# Patient Record
Sex: FEMALE | Race: OTHER | NOT HISPANIC OR LATINO | URBAN - METROPOLITAN AREA
[De-identification: names, ages, dates, MRNs, and addresses within clinical notes are randomized per-mention and may not be internally consistent; named-entity substitution may affect disease eponyms.]

---

## 2019-03-01 ENCOUNTER — OUTPATIENT (OUTPATIENT)
Dept: OUTPATIENT SERVICES | Facility: HOSPITAL | Age: 79
LOS: 1 days | End: 2019-03-01
Payer: MEDICAID

## 2019-03-04 ENCOUNTER — INPATIENT (INPATIENT)
Facility: HOSPITAL | Age: 79
LOS: 1 days | Discharge: HOME | DRG: 303 | End: 2019-03-06
Attending: THORACIC SURGERY (CARDIOTHORACIC VASCULAR SURGERY) | Admitting: THORACIC SURGERY (CARDIOTHORACIC VASCULAR SURGERY)
Payer: MEDICAID

## 2019-03-04 VITALS
SYSTOLIC BLOOD PRESSURE: 111 MMHG | WEIGHT: 142.2 LBS | RESPIRATION RATE: 18 BRPM | OXYGEN SATURATION: 97 % | HEART RATE: 78 BPM | HEIGHT: 60 IN | DIASTOLIC BLOOD PRESSURE: 74 MMHG | TEMPERATURE: 99 F

## 2019-03-04 DIAGNOSIS — I25.10 ATHEROSCLEROTIC HEART DISEASE OF NATIVE CORONARY ARTERY WITHOUT ANGINA PECTORIS: ICD-10-CM

## 2019-03-04 PROCEDURE — 99223 1ST HOSP IP/OBS HIGH 75: CPT

## 2019-03-04 NOTE — H&P ADULT - ASSESSMENT
77 y.o. Iraqi speaking female who presented for elective cardiac catheterization s/p MI and CVA in 5/2018 with residual left arm weakness and dysarthria. Patient was found to have abnormal stress test -EF 45%, anterior wall ischemia. Echo EF 55%, mild to moderate MR, Mild LA enlargement and was transferred for assessment of candidacy for surgical management of left main/multivessel CAD.   Daughters have verbalized that they want what is best for their mother, agreeable to surgery, but would like to discuss all options with the surgeon.

## 2019-03-04 NOTE — H&P ADULT - NSHPREVIEWOFSYSTEMS_GEN_ALL_CORE
Review of Systems  GENERAL:  Fevers[] chills[] sweats[] fatigue[] unintentional weight loss[] weight gain []                                        NEURO:  paresthesias[] seizures []  syncope []  confusion []                                                                                  EYES: glasses[]  blurry vision[]  discharge[] pain[] glaucoma []                                                                            ENMT:  difficulty hearing []  vertigo[]  dysphagia[] epistaxis[] recent dental work []                                      CV:  chest pain[] palpitations[] PERALES [] diaphoresis [] edema[]                                                                                             RESPIRATORY:  wheezing[] SOB[] cough [] sputum[] hemoptysis[]                                                                    GI:  nausea[]  vomiting []  diarrhea[] constipation [] melena []   abdominal pain [  ]                                                                     : hematuria[ ]  dysuria[ ] urgency[] incontinence[]                                                                                              MUSCULOSKELETAL:  arthritis[ ]  joint swelling [ ] muscle weakness [ ]                                                                  SKIN/BREAST:  rash[ ] itching [ ]  hair loss[ ] masses[ ]   incisional drainage [  ]                                                                                             PSYCH:  dementia [ ] depression [ ] anxiety[ ]                                                                                                                  HEME/LYMPH:  bruises easily[ ] enlarged lymph nodes[ ] tender lymph nodes[ ]                                                 ENDOCRINE:  cold intolerance[ ] heat intolerance[ ] polydipsia[ ]

## 2019-03-04 NOTE — H&P ADULT - NSHPLABSRESULTS_GEN_ALL_CORE
Cath:  No ventriculogram recent echocardiogram  demonstrated an EF of 40 %.  CORONARY VESSELS: The coronary circulation is right dominant.  LM:   --  Distal left main: There was a 40 % stenosis.  LAD:   --  Proximal LAD: There was a tubular 70 % stenosis.  --  Mid LAD: There was a diffuse 90 % stenosis.  --  D2: There was a tubular 60 % stenosis.  CX:   --  Proximal circumflex: There was a tubular 99 % stenosis.  --  Mid circumflex: There was a tubular 80 % stenosis.  RCA:   --  RCA: Normal.  COMPLICATIONS: There were no complications.  INTERVENTIONAL RECOMMENDATIONS: Consultation with a cardiac surgeon be  obtained for surgical opinion and coronary artery bypass grafting.  Prepared and signed by  Himanshu Kiran M.D.  Signed 03/04/2019 17:31:58

## 2019-03-04 NOTE — H&P ADULT - PMH
Coronary artery disease involving native coronary artery of native heart without angina pectoris    CVA, old, dysarthria    Dentures complicating chewing    Dysarthria    Left arm weakness    Left main coronary artery disease    Vascular dementia without behavioral disturbance

## 2019-03-04 NOTE — H&P ADULT - NSHPSOCIALHISTORY_GEN_ALL_CORE
SOCIAL HISTORY:  Smoker: [x ] Yes  [ ] No        PACK YEARS:          Quit date:  Unknown but per daughter via translation patient smoked for many years  ETOH use: [ ] Yes  [x ] No              FREQUENCY / QUANTITY:  Ilicit Drug use:  [ ] Yes  [x ] No  Occupation: homemaker   Living arrangements: with daughters.  Patient is able to provide simple self care with family supervision  Assist device use: ambulates independently

## 2019-03-04 NOTE — H&P ADULT - PROBLEM SELECTOR PLAN 1
admit to 2[facility]: Dr. Yo  telemetry monitoring  Increase beta blocker to BID  Hold ARB in anticipation of OR  continue aspirin

## 2019-03-04 NOTE — H&P ADULT - HISTORY OF PRESENT ILLNESS
Patient has two medical record numbers [id number] 77 y.o. Frisian speaking female who presented for elective cardiac catheterization s/p MI and CVA in 5/2018 with residual left arm weakness and dysarthria.  While in [facility] [place], was evaluated by her cardiologist, recommended to have cardiac cath. Patient's daughter sought out second opinion where the patient was found to have abnormal stress test -EF 45%, anterior wall ischemia. Echo EF 55%, mild to moderate MR, Mild LA enlargement.     As  per patient's daughter, the patient doesn't have chest pain, SOB, palpitations, dizziness, presyncope, syncope, b/l lower extremities edema, abdominal pain, N/V/D/C, melena, hematochezia, h/o DVT, PE, OFELIA, fever, chills, urinary symptoms, hematuria, recent travel, sick contacts. As per patient's daughter, the patient does suffer from memory impairment post CVA and frequently has "bluish decoloration of her fingers. Due to patient's PMH and abnormal non invasive tests, the patient was recommended to have cardiac cath.   The patient was on Simvastatin 20 mg po daily. As per patient's daughter, Simvastatin stopped by patient's cardiologist, unknown reason.    Cardiac cath revealed left main and multivessel CAD.  She was transferred from [facility] after procedure for further management.    Currently, the patient is A+Ox1, without pain, and can follow simple commands with prompting when spoken to in Frisian,  and daughters at bedside.  The patient and family are exclusively Frisian speaking and require translation via [employer].    Per , patient denies chest pain, shortness of breath, fever, chills, abdominal pain, nausea, vomiting, nor dysuria.   +left shoulder pain which is relieved by warm pack and massage.       Patient has two medical record numbers [id number] 77 y.o. Arabic speaking female who presented for elective cardiac catheterization s/p MI and CVA in 5/2018 with residual left arm weakness and dysarthria.  While in [facility] [place], was evaluated by her cardiologist, recommended to have cardiac cath. Patient's daughter sought out second opinion where the patient was found to have abnormal stress test -EF 45%, anterior wall ischemia. Echo EF 55%, mild to moderate MR, Mild LA enlargement.     As  per patient's daughter, the patient doesn't have chest pain, SOB, palpitations, dizziness, presyncope, syncope, b/l lower extremities edema, abdominal pain, N/V/D/C, melena, hematochezia, h/o DVT, PE, OFELIA, fever, chills, urinary symptoms, hematuria, recent travel, sick contacts. As per patient's daughter, the patient does suffer from memory impairment post CVA and frequently has "bluish decoloration of her fingers. Due to patient's PMH and abnormal non invasive tests, the patient was recommended to have cardiac cath.   The patient was on Simvastatin 20 mg po daily. As per patient's daughter, Simvastatin stopped by patient's cardiologist, unknown reason.    Cardiac cath revealed left main and multivessel CAD.  She was transferred from [facility] after procedure for further management.    Currently, the patient is A+Ox1, without pain, and can follow simple commands with prompting when spoken to in Arabic,  and daughters at bedside.  The patient and family are exclusively Arabic speaking and require translation via [employer].    Per , patient denies chest pain, shortness of breath, fever, chills, abdominal pain, nausea, vomiting, nor dysuria.   +left shoulder pain which is relieved by warm pack and massage.     Daughters: Eunice Gill and Vanessa make medical decisions for the patient    Patient has two medical record numbers [id number]

## 2019-03-05 DIAGNOSIS — Z01.818 ENCOUNTER FOR OTHER PREPROCEDURAL EXAMINATION: ICD-10-CM

## 2019-03-05 DIAGNOSIS — I25.10 ATHEROSCLEROTIC HEART DISEASE OF NATIVE CORONARY ARTERY WITHOUT ANGINA PECTORIS: ICD-10-CM

## 2019-03-05 DIAGNOSIS — Z29.9 ENCOUNTER FOR PROPHYLACTIC MEASURES, UNSPECIFIED: ICD-10-CM

## 2019-03-05 DIAGNOSIS — Z79.01 LONG TERM (CURRENT) USE OF ANTICOAGULANTS: ICD-10-CM

## 2019-03-05 DIAGNOSIS — Z98.890 OTHER SPECIFIED POSTPROCEDURAL STATES: Chronic | ICD-10-CM

## 2019-03-05 DIAGNOSIS — I69.322 DYSARTHRIA FOLLOWING CEREBRAL INFARCTION: ICD-10-CM

## 2019-03-05 LAB
ALBUMIN SERPL ELPH-MCNC: 4.2 G/DL — SIGNIFICANT CHANGE UP (ref 3.3–5)
ALP SERPL-CCNC: 68 U/L — SIGNIFICANT CHANGE UP (ref 40–120)
ALT FLD-CCNC: 12 U/L — SIGNIFICANT CHANGE UP (ref 10–45)
ANION GAP SERPL CALC-SCNC: 15 MMOL/L — SIGNIFICANT CHANGE UP (ref 5–17)
APPEARANCE UR: CLEAR — SIGNIFICANT CHANGE UP
APTT BLD: 41.4 SEC — HIGH (ref 27.5–36.3)
APTT BLD: 54.4 SEC — HIGH (ref 27.5–36.3)
AST SERPL-CCNC: 17 U/L — SIGNIFICANT CHANGE UP (ref 10–40)
BACTERIA # UR AUTO: NEGATIVE — SIGNIFICANT CHANGE UP
BASOPHILS # BLD AUTO: 0 K/UL — SIGNIFICANT CHANGE UP (ref 0–0.2)
BASOPHILS NFR BLD AUTO: 0.6 % — SIGNIFICANT CHANGE UP (ref 0–2)
BILIRUB SERPL-MCNC: 0.4 MG/DL — SIGNIFICANT CHANGE UP (ref 0.2–1.2)
BILIRUB UR-MCNC: NEGATIVE — SIGNIFICANT CHANGE UP
BLD GP AB SCN SERPL QL: NEGATIVE — SIGNIFICANT CHANGE UP
BUN SERPL-MCNC: 20 MG/DL — SIGNIFICANT CHANGE UP (ref 7–23)
CALCIUM SERPL-MCNC: 10.2 MG/DL — SIGNIFICANT CHANGE UP (ref 8.4–10.5)
CHLORIDE SERPL-SCNC: 100 MMOL/L — SIGNIFICANT CHANGE UP (ref 96–108)
CO2 SERPL-SCNC: 22 MMOL/L — SIGNIFICANT CHANGE UP (ref 22–31)
COLOR SPEC: SIGNIFICANT CHANGE UP
CREAT SERPL-MCNC: 1 MG/DL — SIGNIFICANT CHANGE UP (ref 0.5–1.3)
DIFF PNL FLD: NEGATIVE — SIGNIFICANT CHANGE UP
EOSINOPHIL # BLD AUTO: 0.2 K/UL — SIGNIFICANT CHANGE UP (ref 0–0.5)
EOSINOPHIL NFR BLD AUTO: 2.9 % — SIGNIFICANT CHANGE UP (ref 0–6)
EPI CELLS # UR: 3 /HPF — SIGNIFICANT CHANGE UP
FIBRINOGEN PPP-MCNC: 602 MG/DL — HIGH (ref 350–510)
GLUCOSE BLDC GLUCOMTR-MCNC: 116 MG/DL — HIGH (ref 70–99)
GLUCOSE BLDC GLUCOMTR-MCNC: 122 MG/DL — HIGH (ref 70–99)
GLUCOSE BLDC GLUCOMTR-MCNC: 125 MG/DL — HIGH (ref 70–99)
GLUCOSE BLDC GLUCOMTR-MCNC: 141 MG/DL — HIGH (ref 70–99)
GLUCOSE SERPL-MCNC: 103 MG/DL — HIGH (ref 70–99)
GLUCOSE UR QL: NEGATIVE — SIGNIFICANT CHANGE UP
HCT VFR BLD CALC: 39.1 % — SIGNIFICANT CHANGE UP (ref 34.5–45)
HCT VFR BLD CALC: 39.5 % — SIGNIFICANT CHANGE UP (ref 34.5–45)
HGB BLD-MCNC: 13.7 G/DL — SIGNIFICANT CHANGE UP (ref 11.5–15.5)
HGB BLD-MCNC: 13.9 G/DL — SIGNIFICANT CHANGE UP (ref 11.5–15.5)
HYALINE CASTS # UR AUTO: 1 /LPF — SIGNIFICANT CHANGE UP (ref 0–2)
INR BLD: 1.02 RATIO — SIGNIFICANT CHANGE UP (ref 0.88–1.16)
KETONES UR-MCNC: NEGATIVE — SIGNIFICANT CHANGE UP
LEUKOCYTE ESTERASE UR-ACNC: ABNORMAL
LYMPHOCYTES # BLD AUTO: 2.5 K/UL — SIGNIFICANT CHANGE UP (ref 1–3.3)
LYMPHOCYTES # BLD AUTO: 39.9 % — SIGNIFICANT CHANGE UP (ref 13–44)
MCHC RBC-ENTMCNC: 29.4 PG — SIGNIFICANT CHANGE UP (ref 27–34)
MCHC RBC-ENTMCNC: 29.5 PG — SIGNIFICANT CHANGE UP (ref 27–34)
MCHC RBC-ENTMCNC: 35 GM/DL — SIGNIFICANT CHANGE UP (ref 32–36)
MCHC RBC-ENTMCNC: 35.1 GM/DL — SIGNIFICANT CHANGE UP (ref 32–36)
MCV RBC AUTO: 83.6 FL — SIGNIFICANT CHANGE UP (ref 80–100)
MCV RBC AUTO: 84.4 FL — SIGNIFICANT CHANGE UP (ref 80–100)
MONOCYTES # BLD AUTO: 0.7 K/UL — SIGNIFICANT CHANGE UP (ref 0–0.9)
MONOCYTES NFR BLD AUTO: 11.5 % — SIGNIFICANT CHANGE UP (ref 2–14)
MRSA PCR RESULT.: SIGNIFICANT CHANGE UP
NEUTROPHILS # BLD AUTO: 2.8 K/UL — SIGNIFICANT CHANGE UP (ref 1.8–7.4)
NEUTROPHILS NFR BLD AUTO: 45.2 % — SIGNIFICANT CHANGE UP (ref 43–77)
NITRITE UR-MCNC: NEGATIVE — SIGNIFICANT CHANGE UP
PH UR: 7 — SIGNIFICANT CHANGE UP (ref 5–8)
PLATELET # BLD AUTO: 258 K/UL — SIGNIFICANT CHANGE UP (ref 150–400)
PLATELET # BLD AUTO: 259 K/UL — SIGNIFICANT CHANGE UP (ref 150–400)
POTASSIUM SERPL-MCNC: 4.5 MMOL/L — SIGNIFICANT CHANGE UP (ref 3.5–5.3)
POTASSIUM SERPL-SCNC: 4.5 MMOL/L — SIGNIFICANT CHANGE UP (ref 3.5–5.3)
PROT SERPL-MCNC: 7.3 G/DL — SIGNIFICANT CHANGE UP (ref 6–8.3)
PROT UR-MCNC: ABNORMAL
PROTHROM AB SERPL-ACNC: 11.7 SEC — SIGNIFICANT CHANGE UP (ref 10–12.9)
RBC # BLD: 4.63 M/UL — SIGNIFICANT CHANGE UP (ref 3.8–5.2)
RBC # BLD: 4.73 M/UL — SIGNIFICANT CHANGE UP (ref 3.8–5.2)
RBC # FLD: 12.1 % — SIGNIFICANT CHANGE UP (ref 10.3–14.5)
RBC # FLD: 12.2 % — SIGNIFICANT CHANGE UP (ref 10.3–14.5)
RBC CASTS # UR COMP ASSIST: 3 /HPF — SIGNIFICANT CHANGE UP (ref 0–4)
RH IG SCN BLD-IMP: POSITIVE — SIGNIFICANT CHANGE UP
RH IG SCN BLD-IMP: POSITIVE — SIGNIFICANT CHANGE UP
S AUREUS DNA NOSE QL NAA+PROBE: SIGNIFICANT CHANGE UP
SODIUM SERPL-SCNC: 137 MMOL/L — SIGNIFICANT CHANGE UP (ref 135–145)
SP GR SPEC: 1.03 — HIGH (ref 1.01–1.02)
UROBILINOGEN FLD QL: NEGATIVE — SIGNIFICANT CHANGE UP
WBC # BLD: 5.2 K/UL — SIGNIFICANT CHANGE UP (ref 3.8–10.5)
WBC # BLD: 6.3 K/UL — SIGNIFICANT CHANGE UP (ref 3.8–10.5)
WBC # FLD AUTO: 5.2 K/UL — SIGNIFICANT CHANGE UP (ref 3.8–10.5)
WBC # FLD AUTO: 6.3 K/UL — SIGNIFICANT CHANGE UP (ref 3.8–10.5)
WBC UR QL: 93 /HPF — HIGH (ref 0–5)

## 2019-03-05 PROCEDURE — 93306 TTE W/DOPPLER COMPLETE: CPT | Mod: 26

## 2019-03-05 PROCEDURE — 99232 SBSQ HOSP IP/OBS MODERATE 35: CPT

## 2019-03-05 PROCEDURE — 93010 ELECTROCARDIOGRAM REPORT: CPT

## 2019-03-05 PROCEDURE — 71045 X-RAY EXAM CHEST 1 VIEW: CPT | Mod: 26

## 2019-03-05 RX ORDER — HEPARIN SODIUM 5000 [USP'U]/ML
3800 INJECTION INTRAVENOUS; SUBCUTANEOUS EVERY 6 HOURS
Qty: 0 | Refills: 0 | Status: DISCONTINUED | OUTPATIENT
Start: 2019-03-05 | End: 2019-03-06

## 2019-03-05 RX ORDER — INSULIN LISPRO 100/ML
VIAL (ML) SUBCUTANEOUS
Qty: 0 | Refills: 0 | Status: DISCONTINUED | OUTPATIENT
Start: 2019-03-05 | End: 2019-03-05

## 2019-03-05 RX ORDER — INSULIN LISPRO 100/ML
VIAL (ML) SUBCUTANEOUS
Qty: 0 | Refills: 0 | Status: DISCONTINUED | OUTPATIENT
Start: 2019-03-05 | End: 2019-03-06

## 2019-03-05 RX ORDER — DEXTROSE 50 % IN WATER 50 %
15 SYRINGE (ML) INTRAVENOUS ONCE
Qty: 0 | Refills: 0 | Status: DISCONTINUED | OUTPATIENT
Start: 2019-03-05 | End: 2019-03-06

## 2019-03-05 RX ORDER — CHLORHEXIDINE GLUCONATE 213 G/1000ML
5 SOLUTION TOPICAL ONCE
Qty: 0 | Refills: 0 | Status: DISCONTINUED | OUTPATIENT
Start: 2019-03-05 | End: 2019-03-05

## 2019-03-05 RX ORDER — DEXTROSE 50 % IN WATER 50 %
12.5 SYRINGE (ML) INTRAVENOUS ONCE
Qty: 0 | Refills: 0 | Status: DISCONTINUED | OUTPATIENT
Start: 2019-03-05 | End: 2019-03-06

## 2019-03-05 RX ORDER — HEPARIN SODIUM 5000 [USP'U]/ML
INJECTION INTRAVENOUS; SUBCUTANEOUS
Qty: 25000 | Refills: 0 | Status: DISCONTINUED | OUTPATIENT
Start: 2019-03-05 | End: 2019-03-06

## 2019-03-05 RX ORDER — CHLORHEXIDINE GLUCONATE 213 G/1000ML
1 SOLUTION TOPICAL ONCE
Qty: 0 | Refills: 0 | Status: DISCONTINUED | OUTPATIENT
Start: 2019-03-05 | End: 2019-03-05

## 2019-03-05 RX ORDER — GLUCAGON INJECTION, SOLUTION 0.5 MG/.1ML
1 INJECTION, SOLUTION SUBCUTANEOUS ONCE
Qty: 0 | Refills: 0 | Status: DISCONTINUED | OUTPATIENT
Start: 2019-03-05 | End: 2019-03-06

## 2019-03-05 RX ORDER — ASPIRIN/CALCIUM CARB/MAGNESIUM 324 MG
81 TABLET ORAL DAILY
Qty: 0 | Refills: 0 | Status: DISCONTINUED | OUTPATIENT
Start: 2019-03-05 | End: 2019-03-06

## 2019-03-05 RX ORDER — METOPROLOL TARTRATE 50 MG
25 TABLET ORAL
Qty: 0 | Refills: 0 | Status: DISCONTINUED | OUTPATIENT
Start: 2019-03-05 | End: 2019-03-06

## 2019-03-05 RX ORDER — ASPIRIN/CALCIUM CARB/MAGNESIUM 324 MG
81 TABLET ORAL DAILY
Qty: 0 | Refills: 0 | Status: DISCONTINUED | OUTPATIENT
Start: 2019-03-05 | End: 2019-03-05

## 2019-03-05 RX ORDER — METOPROLOL TARTRATE 50 MG
25 TABLET ORAL
Qty: 0 | Refills: 0 | COMMUNITY

## 2019-03-05 RX ORDER — DEXTROSE 50 % IN WATER 50 %
25 SYRINGE (ML) INTRAVENOUS ONCE
Qty: 0 | Refills: 0 | Status: DISCONTINUED | OUTPATIENT
Start: 2019-03-05 | End: 2019-03-06

## 2019-03-05 RX ORDER — METOPROLOL TARTRATE 50 MG
25 TABLET ORAL DAILY
Qty: 0 | Refills: 0 | Status: DISCONTINUED | OUTPATIENT
Start: 2019-03-05 | End: 2019-03-05

## 2019-03-05 RX ORDER — SODIUM CHLORIDE 9 MG/ML
1000 INJECTION, SOLUTION INTRAVENOUS
Qty: 0 | Refills: 0 | Status: DISCONTINUED | OUTPATIENT
Start: 2019-03-05 | End: 2019-03-05

## 2019-03-05 RX ORDER — CEFUROXIME AXETIL 250 MG
1500 TABLET ORAL ONCE
Qty: 0 | Refills: 0 | Status: DISCONTINUED | OUTPATIENT
Start: 2019-03-05 | End: 2019-03-05

## 2019-03-05 RX ORDER — ATORVASTATIN CALCIUM 80 MG/1
80 TABLET, FILM COATED ORAL AT BEDTIME
Qty: 0 | Refills: 0 | Status: DISCONTINUED | OUTPATIENT
Start: 2019-03-05 | End: 2019-03-06

## 2019-03-05 RX ORDER — PANTOPRAZOLE SODIUM 20 MG/1
40 TABLET, DELAYED RELEASE ORAL
Qty: 0 | Refills: 0 | Status: DISCONTINUED | OUTPATIENT
Start: 2019-03-05 | End: 2019-03-06

## 2019-03-05 RX ADMIN — Medication 25 MILLIGRAM(S): at 05:01

## 2019-03-05 RX ADMIN — ATORVASTATIN CALCIUM 80 MILLIGRAM(S): 80 TABLET, FILM COATED ORAL at 22:42

## 2019-03-05 RX ADMIN — Medication 81 MILLIGRAM(S): at 12:27

## 2019-03-05 RX ADMIN — HEPARIN SODIUM 850 UNIT(S)/HR: 5000 INJECTION INTRAVENOUS; SUBCUTANEOUS at 12:28

## 2019-03-05 RX ADMIN — HEPARIN SODIUM 850 UNIT(S)/HR: 5000 INJECTION INTRAVENOUS; SUBCUTANEOUS at 19:31

## 2019-03-05 RX ADMIN — PANTOPRAZOLE SODIUM 40 MILLIGRAM(S): 20 TABLET, DELAYED RELEASE ORAL at 05:01

## 2019-03-05 RX ADMIN — HEPARIN SODIUM 750 UNIT(S)/HR: 5000 INJECTION INTRAVENOUS; SUBCUTANEOUS at 05:01

## 2019-03-05 RX ADMIN — Medication 25 MILLIGRAM(S): at 18:59

## 2019-03-05 NOTE — PROGRESS NOTE ADULT - ASSESSMENT
History of Present Illness: 	  77 y.o. Icelandic speaking female who presented for elective cardiac catheterization s/p MI and CVA in 5/2018 with residual left arm weakness and dysarthria.  While in [facility] [place], was evaluated by her cardiologist, recommended to have cardiac cath. Patient's daughter sought out second opinion where the patient was found to have abnormal stress test -EF 45%, anterior wall ischemia. Echo EF 55%, mild to moderate MR, Mild LA enlargement.     As  per patient's daughter, the patient doesn't have chest pain, SOB, palpitations, dizziness, presyncope, syncope, b/l lower extremities edema, abdominal pain, N/V/D/C, melena, hematochezia, h/o DVT, PE, OFELIA, fever, chills, urinary symptoms, hematuria, recent travel, sick contacts. As per patient's daughter, the patient does suffer from memory impairment post CVA and frequently has "bluish decoloration of her fingers. Due to patient's PMH and abnormal non invasive tests, the patient was recommended to have cardiac cath.   The patient was on Simvastatin 20 mg po daily. As per patient's daughter, Simvastatin stopped by patient's cardiologist, unknown reason.    Cardiac cath revealed left main and multivessel

## 2019-03-05 NOTE — PROGRESS NOTE ADULT - SUBJECTIVE AND OBJECTIVE BOX
VITAL SIGNS    Telemetry:      Vital Signs Last 24 Hrs  T(C): 36.7 (03-05-19 @ 05:06), Max: 37 (03-04-19 @ 23:23)  T(F): 98.1 (03-05-19 @ 05:06), Max: 98.6 (03-04-19 @ 23:23)  HR: 70 (03-05-19 @ 05:06) (70 - 78)  BP: 130/66 (03-05-19 @ 05:06) (111/74 - 130/66)  RR: 18 (03-05-19 @ 05:06) (18 - 18)  SpO2: 98% (03-05-19 @ 05:06) (97% - 98%)                   Daily Height in cm: 152.4 (04 Mar 2019 23:23)    Daily       Bilirubin Total, Serum: 0.4 mg/dL (03-05 @ 01:39)    CAPILLARY BLOOD GLUCOSE      POCT Blood Glucose.: 122 mg/dL (05 Mar 2019 07:16)          Drains:     MS         [  ] Drainage:                 L Pleural  [  ]  Drainage:                R Pleural  [  ]  Drainage:    Pacing Wires        [  ]   Settings:                                  Isolated  [  ]    Coumadin    [ ] YES          [  ]      NO         Reason:                         PHYSICAL EXAM    Neurology: alert and oriented x 3, moves all extremities with no defecits  CV :  RRR  Sternal Wound :  CDI , Stable  Lungs:   CTA B/L  Abdomen: soft, nontender, nondistended, positive bowel sounds, last bowel movement   Extremities: VITAL SIGNS    Telemetry:    afib  60-80    Vital Signs Last 24 Hrs  T(C): 36.7 (03-05-19 @ 05:06), Max: 37 (03-04-19 @ 23:23)  T(F): 98.1 (03-05-19 @ 05:06), Max: 98.6 (03-04-19 @ 23:23)  HR: 70 (03-05-19 @ 05:06) (70 - 78)  BP: 130/66 (03-05-19 @ 05:06) (111/74 - 130/66)  RR: 18 (03-05-19 @ 05:06) (18 - 18)  SpO2: 98% (03-05-19 @ 05:06) (97% - 98%)                   Daily Height in cm: 152.4 (04 Mar 2019 23:23)          Bilirubin Total, Serum: 0.4 mg/dL (03-05 @ 01:39)    CAPILLARY BLOOD GLUCOSE      POCT Blood Glucose.: 122 mg/dL (05 Mar 2019 07:16)                              PHYSICAL EXAM    Neurology: alert and oriented x 3, moves all extremities with no defecits  CV :  RRR  Lungs:   CTA B/L  Abdomen: soft, nontender, nondistended, positive bowel sounds,   Extremities:     rt groin benign   no VITAL SIGNS    Telemetry:    afib  60-80    Vital Signs Last 24 Hrs  T(C): 36.7 (03-05-19 @ 05:06), Max: 37 (03-04-19 @ 23:23)  T(F): 98.1 (03-05-19 @ 05:06), Max: 98.6 (03-04-19 @ 23:23)  HR: 70 (03-05-19 @ 05:06) (70 - 78)  BP: 130/66 (03-05-19 @ 05:06) (111/74 - 130/66)  RR: 18 (03-05-19 @ 05:06) (18 - 18)  SpO2: 98% (03-05-19 @ 05:06) (97% - 98%)                   Daily Height in cm: 152.4 (04 Mar 2019 23:23)          Bilirubin Total, Serum: 0.4 mg/dL (03-05 @ 01:39)    CAPILLARY BLOOD GLUCOSE      POCT Blood Glucose.: 122 mg/dL (05 Mar 2019 07:16)                              PHYSICAL EXAM    Neurology: alert and oriented x 3, moves all extremities with no defecits  CV :  RRR  Lungs:   CTA B/L  Abdomen: soft, nontender, nondistended, positive bowel sounds,   Extremities:     rt groin benign   no pedal edema

## 2019-03-05 NOTE — CONSULT NOTE ADULT - SUBJECTIVE AND OBJECTIVE BOX
HISTORY OF PRESENT ILLNESS: HPI:  77 y.o. Occitan speaking female who presented for elective cardiac catheterization s/p MI and CVA in 5/2018 with residual left arm weakness and dysarthria.  While in [facility], was evaluated by her cardiologist, recommended to have cardiac cath. Patient's daughter sought out second opinion where the patient was found to have abnormal stress test -EF 45%, anterior wall ischemia. Echo EF 55%, mild to moderate MR, Mild LA enlargement.     As  per patient's daughter, the patient doesn't have chest pain, SOB, palpitations, dizziness, presyncope, syncope, b/l lower extremities edema, abdominal pain, N/V/D/C, melena, hematochezia, h/o DVT, PE, OFELIA, fever, chills, urinary symptoms, hematuria, recent travel, sick contacts. As per patient's daughter, the patient does suffer from memory impairment post CVA and frequently has "bluish decoloration of her fingers. Due to patient's PMH and abnormal non invasive tests, the patient was recommended to have cardiac cath.   The patient was on Simvastatin 20 mg po daily. As per patient's daughter, Simvastatin stopped by patient's cardiologist, unknown reason.    Cardiac cath revealed left main and multivessel CAD.  She was transferred from [facility] after procedure for further management.    Currently, the patient is A+Ox1, without pain, and can follow simple commands with prompting when spoken to in Occitan,  and daughters at bedside.  The patient and family are exclusively Occitan speaking and require translation via [employer].    Per , patient denies chest pain, shortness of breath, fever, chills, abdominal pain, nausea, vomiting, nor dysuria.   +left shoulder pain which is relieved by warm pack and massage.     Daughters: Eunice Gill and Vanessa make medical decisions for the patient    Patient has two medical record numbers [id number] (04 Mar 2019 23:37)      PAST MEDICAL & SURGICAL HISTORY:  Left arm weakness  Left main coronary artery disease  Coronary artery disease involving native coronary artery of native heart without angina pectoris  Dentures complicating chewing  Dysarthria  Vascular dementia without behavioral disturbance  CVA, old, dysarthria  S/P cardiac catheterization          MEDICATIONS:  MEDICATIONS  (STANDING):  aspirin  chewable 81 milliGRAM(s) Oral daily  atorvastatin 80 milliGRAM(s) Oral at bedtime  dextrose 50% Injectable 12.5 Gram(s) IV Push once  dextrose 50% Injectable 25 Gram(s) IV Push once  dextrose 50% Injectable 25 Gram(s) IV Push once  heparin  Infusion.  Unit(s)/Hr (7.5 mL/Hr) IV Continuous <Continuous>  insulin lispro (HumaLOG) corrective regimen sliding scale   SubCutaneous three times a day before meals  metoprolol tartrate 25 milliGRAM(s) Oral two times a day  pantoprazole    Tablet 40 milliGRAM(s) Oral before breakfast      Allergies    No Known Allergies    Intolerances        FAMILY HISTORY:  Family history of diabetes mellitus in mother (Mother)    Non-contributary for premature coronary disease or sudden cardiac death    SOCIAL HISTORY:    [ X] Non-smoker  [ ] Smoker  [ ] Alcohol      REVIEW OF SYSTEMS:  [ ]chest pain  [  ]shortness of breath  [  ]palpitations  [  ]syncope  [ ]near syncope [ ]upper extremity weakness   [ ] lower extremity weakness  [  ]diplopia  [  ]altered mental status   [  ]fevers  [ ]chills [ ]nausea  [ ]vomitting  [  ]dysphagia    [ ]abdominal pain  [ ]melena  [ ]BRBPR    [  ]epistaxis  [  ]rash    [ ]lower extremity edema        [ ] All others negative	  [ ] Unable to obtain      LABS:	 	    CARDIAC MARKERS:                              13.7   5.2   )-----------( 258      ( 05 Mar 2019 11:17 )             39.1     Hb Trend: 13.7<--, 13.9<--    03-05    137  |  100  |  20  ----------------------------<  103<H>  4.5   |  22  |  1.00    Ca    10.2      05 Mar 2019 01:39    TPro  7.3  /  Alb  4.2  /  TBili  0.4  /  DBili  x   /  AST  17  /  ALT  12  /  AlkPhos  68  03-05    Creatinine Trend: 1.00<--    Coags:  PT/INR - ( 05 Mar 2019 01:39 )   PT: 11.7 sec;   INR: 1.02 ratio         PTT - ( 05 Mar 2019 11:18 )  PTT:41.4 sec      PHYSICAL EXAM:  T(C): 36.9 (03-05-19 @ 13:02), Max: 37 (03-04-19 @ 23:23)  HR: 77 (03-05-19 @ 13:02) (70 - 78)  BP: 109/76 (03-05-19 @ 13:02) (109/76 - 130/66)  RR: 16 (03-05-19 @ 13:02) (16 - 18)  SpO2: 98% (03-05-19 @ 13:02) (97% - 98%)  Wt(kg): --  I&O's Summary    04 Mar 2019 07:01  -  05 Mar 2019 07:00  --------------------------------------------------------  IN: 15 mL / OUT: 200 mL / NET: -185 mL    05 Mar 2019 07:01  -  05 Mar 2019 14:23  --------------------------------------------------------  IN: 240 mL / OUT: 0 mL / NET: 240 mL        Gen: Appears well in NAD  HEENT:  (-)icterus (-)pallor  CV: N S1 S2 1/6 ADINA (+)2 Pulses B/l  Resp:  Clear to ausculatation B/L, normal effort  GI: (+) BS Soft, NT, ND  Lymph:  (-)Edema, (-)obvious lymphadenopathy  Skin: Warm to touch, Normal turgor  Psych: Appropriate mood and affect        TELEMETRY: 	  afib    ECG:  	Afib 80 BPM    RADIOLOGY:         CXR:  PENDING    ASSESSMENT/PLAN: 	78y Female female who presented for elective cardiac catheterization s/p MI and CVA in 5/2018 found with LM disease. EF 40%    - CTS f/u  - Cont ASA, Statin  - BB  - IF no OR planned will plan to add Plavix      Oren Lou MD, [facility]  BEEPER [phone]

## 2019-03-06 VITALS — SYSTOLIC BLOOD PRESSURE: 133 MMHG | DIASTOLIC BLOOD PRESSURE: 74 MMHG | HEART RATE: 66 BPM

## 2019-03-06 DIAGNOSIS — Z71.89 OTHER SPECIFIED COUNSELING: ICD-10-CM

## 2019-03-06 LAB
ANION GAP SERPL CALC-SCNC: 17 MMOL/L — SIGNIFICANT CHANGE UP (ref 5–17)
APTT BLD: 71.5 SEC — HIGH (ref 27.5–36.3)
BUN SERPL-MCNC: 20 MG/DL — SIGNIFICANT CHANGE UP (ref 7–23)
CALCIUM SERPL-MCNC: 10.1 MG/DL — SIGNIFICANT CHANGE UP (ref 8.4–10.5)
CHLORIDE SERPL-SCNC: 98 MMOL/L — SIGNIFICANT CHANGE UP (ref 96–108)
CO2 SERPL-SCNC: 20 MMOL/L — LOW (ref 22–31)
CREAT SERPL-MCNC: 0.93 MG/DL — SIGNIFICANT CHANGE UP (ref 0.5–1.3)
GLUCOSE BLDC GLUCOMTR-MCNC: 111 MG/DL — HIGH (ref 70–99)
GLUCOSE BLDC GLUCOMTR-MCNC: 133 MG/DL — HIGH (ref 70–99)
GLUCOSE SERPL-MCNC: 114 MG/DL — HIGH (ref 70–99)
HBA1C BLD-MCNC: 7.2 % — HIGH (ref 4–5.6)
HCT VFR BLD CALC: 40.7 % — SIGNIFICANT CHANGE UP (ref 34.5–45)
HCT VFR BLD CALC: 40.8 % — SIGNIFICANT CHANGE UP (ref 34.5–45)
HGB BLD-MCNC: 12.7 G/DL — SIGNIFICANT CHANGE UP (ref 11.5–15.5)
HGB BLD-MCNC: 13.2 G/DL — SIGNIFICANT CHANGE UP (ref 11.5–15.5)
MCHC RBC-ENTMCNC: 26.3 PG — LOW (ref 27–34)
MCHC RBC-ENTMCNC: 27.9 PG — SIGNIFICANT CHANGE UP (ref 27–34)
MCHC RBC-ENTMCNC: 31.3 GM/DL — LOW (ref 32–36)
MCHC RBC-ENTMCNC: 32.4 GM/DL — SIGNIFICANT CHANGE UP (ref 32–36)
MCV RBC AUTO: 84.2 FL — SIGNIFICANT CHANGE UP (ref 80–100)
MCV RBC AUTO: 86.3 FL — SIGNIFICANT CHANGE UP (ref 80–100)
PLATELET # BLD AUTO: 239 K/UL — SIGNIFICANT CHANGE UP (ref 150–400)
PLATELET # BLD AUTO: 260 K/UL — SIGNIFICANT CHANGE UP (ref 150–400)
POTASSIUM SERPL-MCNC: 4.1 MMOL/L — SIGNIFICANT CHANGE UP (ref 3.5–5.3)
POTASSIUM SERPL-SCNC: 4.1 MMOL/L — SIGNIFICANT CHANGE UP (ref 3.5–5.3)
RBC # BLD: 4.73 M/UL — SIGNIFICANT CHANGE UP (ref 3.8–5.2)
RBC # BLD: 4.84 M/UL — SIGNIFICANT CHANGE UP (ref 3.8–5.2)
RBC # FLD: 12.1 % — SIGNIFICANT CHANGE UP (ref 10.3–14.5)
RBC # FLD: 12.8 % — SIGNIFICANT CHANGE UP (ref 10.3–14.5)
SODIUM SERPL-SCNC: 135 MMOL/L — SIGNIFICANT CHANGE UP (ref 135–145)
WBC # BLD: 5.4 K/UL — SIGNIFICANT CHANGE UP (ref 3.8–10.5)
WBC # BLD: 5.67 K/UL — SIGNIFICANT CHANGE UP (ref 3.8–10.5)
WBC # FLD AUTO: 5.4 K/UL — SIGNIFICANT CHANGE UP (ref 3.8–10.5)
WBC # FLD AUTO: 5.67 K/UL — SIGNIFICANT CHANGE UP (ref 3.8–10.5)

## 2019-03-06 PROCEDURE — 86901 BLOOD TYPING SEROLOGIC RH(D): CPT

## 2019-03-06 PROCEDURE — 80048 BASIC METABOLIC PNL TOTAL CA: CPT

## 2019-03-06 PROCEDURE — 82962 GLUCOSE BLOOD TEST: CPT

## 2019-03-06 PROCEDURE — 87640 STAPH A DNA AMP PROBE: CPT

## 2019-03-06 PROCEDURE — C8929: CPT

## 2019-03-06 PROCEDURE — 86900 BLOOD TYPING SEROLOGIC ABO: CPT

## 2019-03-06 PROCEDURE — 93005 ELECTROCARDIOGRAM TRACING: CPT

## 2019-03-06 PROCEDURE — 97161 PT EVAL LOW COMPLEX 20 MIN: CPT

## 2019-03-06 PROCEDURE — 80053 COMPREHEN METABOLIC PANEL: CPT

## 2019-03-06 PROCEDURE — 85384 FIBRINOGEN ACTIVITY: CPT

## 2019-03-06 PROCEDURE — 86850 RBC ANTIBODY SCREEN: CPT

## 2019-03-06 PROCEDURE — 94010 BREATHING CAPACITY TEST: CPT

## 2019-03-06 PROCEDURE — 71045 X-RAY EXAM CHEST 1 VIEW: CPT

## 2019-03-06 PROCEDURE — 85610 PROTHROMBIN TIME: CPT

## 2019-03-06 PROCEDURE — 81001 URINALYSIS AUTO W/SCOPE: CPT

## 2019-03-06 PROCEDURE — 85027 COMPLETE CBC AUTOMATED: CPT

## 2019-03-06 PROCEDURE — 85730 THROMBOPLASTIN TIME PARTIAL: CPT

## 2019-03-06 PROCEDURE — 83036 HEMOGLOBIN GLYCOSYLATED A1C: CPT

## 2019-03-06 PROCEDURE — 99238 HOSP IP/OBS DSCHRG MGMT 30/<: CPT

## 2019-03-06 PROCEDURE — 87641 MR-STAPH DNA AMP PROBE: CPT

## 2019-03-06 RX ORDER — METOPROLOL TARTRATE 50 MG
1 TABLET ORAL
Qty: 0 | Refills: 0 | COMMUNITY

## 2019-03-06 RX ORDER — APIXABAN 2.5 MG/1
5 TABLET, FILM COATED ORAL EVERY 12 HOURS
Qty: 0 | Refills: 0 | Status: DISCONTINUED | OUTPATIENT
Start: 2019-03-06 | End: 2019-03-06

## 2019-03-06 RX ORDER — ASPIRIN/CALCIUM CARB/MAGNESIUM 324 MG
1 TABLET ORAL
Qty: 0 | Refills: 0 | COMMUNITY

## 2019-03-06 RX ORDER — ATORVASTATIN CALCIUM 80 MG/1
1 TABLET, FILM COATED ORAL
Qty: 30 | Refills: 0 | OUTPATIENT
Start: 2019-03-06 | End: 2019-04-04

## 2019-03-06 RX ORDER — METFORMIN HYDROCHLORIDE 850 MG/1
1 TABLET ORAL
Qty: 0 | Refills: 0 | COMMUNITY

## 2019-03-06 RX ORDER — APIXABAN 2.5 MG/1
1 TABLET, FILM COATED ORAL
Qty: 0 | Refills: 0 | COMMUNITY

## 2019-03-06 RX ORDER — LISINOPRIL 2.5 MG/1
2.5 TABLET ORAL DAILY
Qty: 0 | Refills: 0 | Status: DISCONTINUED | OUTPATIENT
Start: 2019-03-06 | End: 2019-03-06

## 2019-03-06 RX ORDER — METOPROLOL TARTRATE 50 MG
1 TABLET ORAL
Qty: 60 | Refills: 0 | OUTPATIENT
Start: 2019-03-06 | End: 2019-04-04

## 2019-03-06 RX ORDER — LISINOPRIL 2.5 MG/1
1 TABLET ORAL
Qty: 31 | Refills: 0 | OUTPATIENT
Start: 2019-03-06 | End: 2019-04-05

## 2019-03-06 RX ADMIN — APIXABAN 5 MILLIGRAM(S): 2.5 TABLET, FILM COATED ORAL at 09:21

## 2019-03-06 RX ADMIN — Medication 81 MILLIGRAM(S): at 13:57

## 2019-03-06 RX ADMIN — Medication 25 MILLIGRAM(S): at 05:24

## 2019-03-06 RX ADMIN — PANTOPRAZOLE SODIUM 40 MILLIGRAM(S): 20 TABLET, DELAYED RELEASE ORAL at 05:24

## 2019-03-06 RX ADMIN — HEPARIN SODIUM 850 UNIT(S)/HR: 5000 INJECTION INTRAVENOUS; SUBCUTANEOUS at 02:07

## 2019-03-06 NOTE — PHYSICAL THERAPY INITIAL EVALUATION ADULT - DISCHARGE DISPOSITION, PT EVAL
home w/ outpatient services/Home with outpatient PT for L UE strengthening and balance training. No AD recommendation, assist with all mobility skills (as prior). **Pt cleared for d/c home from PT perspective at this time.

## 2019-03-06 NOTE — PROGRESS NOTE ADULT - SUBJECTIVE AND OBJECTIVE BOX
Subjective: pt seen and examined, no complaints, ROS - .     no chest pain or sob on exam,    aspirin  chewable 81 milliGRAM(s) Oral daily  atorvastatin 80 milliGRAM(s) Oral at bedtime  dextrose 40% Gel 15 Gram(s) Oral once PRN  dextrose 50% Injectable 12.5 Gram(s) IV Push once  dextrose 50% Injectable 25 Gram(s) IV Push once  dextrose 50% Injectable 25 Gram(s) IV Push once  glucagon  Injectable 1 milliGRAM(s) IntraMuscular once PRN  heparin  Infusion.  Unit(s)/Hr IV Continuous <Continuous>  heparin  Injectable 3800 Unit(s) IV Push every 6 hours PRN  insulin lispro (HumaLOG) corrective regimen sliding scale   SubCutaneous Before meals and at bedtime  metoprolol tartrate 25 milliGRAM(s) Oral two times a day  pantoprazole    Tablet 40 milliGRAM(s) Oral before breakfast                            12.7   5.4   )-----------( 239      ( 06 Mar 2019 06:32 )             40.7       Hemoglobin: 12.7 g/dL (03-06 @ 06:32)  Hemoglobin: 13.7 g/dL (03-05 @ 11:17)  Hemoglobin: 13.9 g/dL (03-05 @ 01:39)      03-06    135  |  98  |  20  ----------------------------<  114<H>  4.1   |  20<L>  |  0.93    Ca    10.1      06 Mar 2019 06:31    TPro  7.3  /  Alb  4.2  /  TBili  0.4  /  DBili  x   /  AST  17  /  ALT  12  /  AlkPhos  68  03-05    Creatinine Trend: 0.93<--, 1.00<--    COAGS: PTT - ( 06 Mar 2019 01:15 )  PTT:71.5 sec          T(C): 36.7 (03-06-19 @ 05:27), Max: 36.9 (03-05-19 @ 13:02)  HR: 80 (03-06-19 @ 05:27) (73 - 88)  BP: 130/59 (03-06-19 @ 05:27) (109/76 - 130/59)  RR: 18 (03-06-19 @ 05:27) (16 - 18)  SpO2: 93% (03-06-19 @ 05:27) (93% - 99%)  Wt(kg): --    I&O's Summary    05 Mar 2019 07:01  -  06 Mar 2019 07:00  --------------------------------------------------------  IN: 1164 mL / OUT: 0 mL / NET: 1164 mL        Gen: Appears well in NAD  HEENT:  (-)icterus (-)pallor  CV: N S1 S2 1/6 ADINA (+)2 Pulses B/l  Resp:  Clear to ausculatation B/L, normal effort  GI: (+) BS Soft, NT, ND  Lymph:  (-)Edema, (-)obvious lymphadenopathy  Skin: Warm to touch, Normal turgor  Psych: Appropriate mood and affect        TELEMETRY: 	  afib    ECG:  	Afib 80 BPM    RADIOLOGY:         CXR:  PENDING    ASSESSMENT/PLAN: 	78y Female female who presented for elective cardiac catheterization s/p MI and CVA in 5/2018 found with LM disease. EF 40%    Tele stable   ASA, statin   A/C with heparin gtt at present   HR stable on BB,   CTS follow up , work up in progress for +/- CABG  **IF no OR planned will plan to add Plavix  D/W Dr Lou

## 2019-03-06 NOTE — PHARMACOTHERAPY INTERVENTION NOTE - COMMENTS
Counseled patient's daughter on discharge medication doses, indications, and possible side effects (via  phone,  ID [id number]). Provided [employer] med essentials fact sheets in Portuguese for all discharge medications and typed medication list with medication doses, indications, and time of day to take.    Cheryl Daily, PharmD   [phone]

## 2019-03-06 NOTE — DISCHARGE NOTE NURSING/CASE MANAGEMENT/SOCIAL WORK - NSDCDPATPORTLINK_GEN_ALL_CORE
You can access the [facility], offered by [facility], by registering with the following website: [email or web address]

## 2019-03-06 NOTE — DISCHARGE NOTE PROVIDER - CARE PROVIDER_API CALL
Lennox Olea)  Cardiac Electrophysiology; Cardiovascular Disease; Nuclear Cardiology  [address], [address]  [place] [address]  Phone: [phone]  Fax: [phone]  Follow Up Time: 2 weeks

## 2019-03-06 NOTE — PHYSICAL THERAPY INITIAL EVALUATION ADULT - DIAGNOSIS, PT EVAL
Pt at her functional baseline and can be d/c from PT program at this time; strength & balance deficits to be addressed with outpatient PT

## 2019-03-06 NOTE — DISCHARGE NOTE PROVIDER - HOSPITAL COURSE
History of Present Illness:     77 y.o. Upper sorbian speaking female who presented for elective cardiac catheterization s/p MI and CVA in 5/2018 with residual left arm weakness and dysarthria.  While in [facility] [place], was evaluated by her cardiologist, recommended to have cardiac cath. Patient's daughter sought out second opinion where the patient was found to have abnormal stress test -EF 45%, anterior wall ischemia. Echo EF 55%, mild to moderate MR, Mild LA enlargement.         As  per patient's daughter, the patient doesn't have chest pain, SOB, palpitations, dizziness, presyncope, syncope, b/l lower extremities edema, abdominal pain, N/V/D/C, melena, hematochezia, h/o DVT, PE, OFELIA, fever, chills, urinary symptoms, hematuria, recent travel, sick contacts. As per patient's daughter, the patient does suffer from memory impairment post CVA and frequently has "bluish decoloration of her fingers. Due to patient's PMH and abnormal non invasive tests, the patient was recommended to have cardiac cath.     The patient was on Simvastatin 20 mg po daily. As per patient's daughter, Simvastatin stopped by patient's cardiologist, unknown reason.    Cardiac cath revealed left main and multivessel       3/6  vss   dc home with cardiology follow up.                COR  RRR    Lungs CTA    GI    soft ND    ext  rt groin benign,  no pedal edema        35 min spent on discharge

## 2019-03-06 NOTE — DISCHARGE NOTE PROVIDER - NSDCCPCAREPLAN_GEN_ALL_CORE_FT
PRINCIPAL DISCHARGE DIAGNOSIS  Problem: Coronary artery disease involving native coronary artery of native heart without angina pectoris  Assessment and Plan of Treatment: Coronary artery disease involving native coronary artery of native heart without angina pectoris

## 2019-03-06 NOTE — PROGRESS NOTE ADULT - ATTENDING COMMENTS
Patient seen and examined.  Agree with above.   -if no CABG planned, would medically manage cad given advanced dementia and poor functional status  -continue with asa/statin/beta blockers  -ac for afib for cva prevention     Suzanne Fitzgerald MD

## 2019-03-06 NOTE — PHYSICAL THERAPY INITIAL EVALUATION ADULT - PERTINENT HX OF CURRENT PROBLEM, REHAB EVAL
77 y.o. [place] speaking female who presented for elective cardiac catheterization s/p MI and CVA in 5/2018 with residual left arm weakness and dysarthria. Patient was found to have abnormal stress test -EF 45%, anterior wall ischemia. Echo EF 55%, mild to moderate MR, Mild LA enlargement and was transferred for assessment of candidacy for surgical management of left main/multivessel CAD.

## 2019-03-06 NOTE — PHARMACOTHERAPY INTERVENTION NOTE - COMMENTS
Patient with history of MI, diabetes mellitus, echo on 3/5/19 showing EF 40% with mild to moderate LV dysfunction. On beta blocker and HCTZ at home, recommended consider switch HCTZ to low dose ACE inhibitor given comorbidities if no contraindications (Scr 0.93, K 4.1 today). Discussed with CT surgery NP and cardiology Dr. Fitzgerald, HCTZ changed to lisinopril 2.5 mg daily for discharge.    Cheryl Daily, PharmD   [phone] Patient with history of MI (5/2018 per notes), diabetes mellitus (home metformin), echo on 3/5/19 showing EF 40% with mild to moderate LV dysfunction. On beta blocker and HCTZ at home, recommended consider switch HCTZ to low dose ACE inhibitor given comorbidities if no contraindications (Scr 0.93, K 4.1 today). Discussed with CT surgery NP and cardiology Dr. Fitzgerald, HCTZ changed to lisinopril 2.5 mg daily for discharge.    Cheryl Daily, PharmD   [phone]

## 2019-04-01 ENCOUNTER — OUTPATIENT (OUTPATIENT)
Dept: OUTPATIENT SERVICES | Facility: HOSPITAL | Age: 79
LOS: 1 days | End: 2019-04-01
Payer: MEDICAID

## 2019-04-01 DIAGNOSIS — Z98.890 OTHER SPECIFIED POSTPROCEDURAL STATES: Chronic | ICD-10-CM

## 2019-04-04 DIAGNOSIS — Z76.89 PERSONS ENCOUNTERING HEALTH SERVICES IN OTHER SPECIFIED CIRCUMSTANCES: ICD-10-CM

## 2019-07-15 PROCEDURE — G9005: CPT

## 2019-07-15 PROCEDURE — G9001: CPT

## 2019-07-24 DIAGNOSIS — Z71.89 OTHER SPECIFIED COUNSELING: ICD-10-CM

## 2019-07-24 PROBLEM — K08.89 OTHER SPECIFIED DISORDERS OF TEETH AND SUPPORTING STRUCTURES: Chronic | Status: ACTIVE | Noted: 2019-03-05

## 2019-07-24 PROBLEM — R47.1 DYSARTHRIA AND ANARTHRIA: Chronic | Status: ACTIVE | Noted: 2019-03-05

## 2019-07-24 PROBLEM — F01.50 VASCULAR DEMENTIA WITHOUT BEHAVIORAL DISTURBANCE: Chronic | Status: ACTIVE | Noted: 2019-03-05

## 2019-07-24 PROBLEM — R29.898 OTHER SYMPTOMS AND SIGNS INVOLVING THE MUSCULOSKELETAL SYSTEM: Chronic | Status: ACTIVE | Noted: 2019-03-05

## 2019-07-24 PROBLEM — I25.10 ATHEROSCLEROTIC HEART DISEASE OF NATIVE CORONARY ARTERY WITHOUT ANGINA PECTORIS: Chronic | Status: ACTIVE | Noted: 2019-03-05

## 2019-07-24 PROBLEM — I69.322 DYSARTHRIA FOLLOWING CEREBRAL INFARCTION: Chronic | Status: ACTIVE | Noted: 2019-03-05

## 2020-08-31 NOTE — DISCHARGE NOTE NURSING/CASE MANAGEMENT/SOCIAL WORK - NSDPLANGINTNAME_GEN_ALL_CORE
Patient called for refills.        Last seen 7/20/2020  Next appt 9/19/2020  [facility] from [employer]

## 2020-10-08 NOTE — PHYSICAL THERAPY INITIAL EVALUATION ADULT - ADDITIONAL COMMENTS
[FreeTextEntry2] : Pt counseled on proper diet and exercise. We discussed the importance of exercise in maintaining a healthy life style.\par 
Pt lives with 2 daughters and granddaughter in 2nd floor apartment with 1 flight of stairs to enter (+) HR. Pt owns walker.

## 2021-04-01 PROCEDURE — G9005: CPT

## 2022-04-19 NOTE — PATIENT PROFILE ADULT - PRIMARY ROLES/RESPONSIBILITIES
Front office called and said patient had called [facility] and they told him to go to the ER but now he is downstairs in wanting to see Dr Harden.  Spoke with Natanael and notified him Dr Harden is done for the day.  Tanika states there are no open appointments for him to be seen by another provider. Dr Harden advised that [facility] go to [facility] if no open appointments in our office.   He states he has been having problems with is BP going up and down after he exercised last night the systolic was 140  Did not know the diastolic and he has been having tingling in his fingers.  His wife gave him one of her BP medications to take.  Advised him that he should not be taking anyone else medication with out checking with a Provider.   He said he would go to [facility]    Advised him to make a follow up appointment with Dr Harden to discuss BP      none